# Patient Record
Sex: MALE | Race: WHITE | Employment: OTHER | URBAN - NONMETROPOLITAN AREA
[De-identification: names, ages, dates, MRNs, and addresses within clinical notes are randomized per-mention and may not be internally consistent; named-entity substitution may affect disease eponyms.]

---

## 2019-04-26 ENCOUNTER — OFFICE VISIT (OUTPATIENT)
Dept: PRIMARY CARE CLINIC | Age: 64
End: 2019-04-26
Payer: MEDICARE

## 2019-04-26 VITALS
HEART RATE: 120 BPM | HEIGHT: 69 IN | DIASTOLIC BLOOD PRESSURE: 76 MMHG | BODY MASS INDEX: 34.66 KG/M2 | TEMPERATURE: 97.6 F | OXYGEN SATURATION: 99 % | WEIGHT: 234 LBS | SYSTOLIC BLOOD PRESSURE: 128 MMHG

## 2019-04-26 DIAGNOSIS — E78.2 MIXED HYPERLIPIDEMIA: ICD-10-CM

## 2019-04-26 DIAGNOSIS — Z91.81 AT HIGH RISK FOR FALLS: ICD-10-CM

## 2019-04-26 DIAGNOSIS — F41.9 ANXIETY: ICD-10-CM

## 2019-04-26 DIAGNOSIS — Z76.89 ENCOUNTER TO ESTABLISH CARE: ICD-10-CM

## 2019-04-26 DIAGNOSIS — R11.0 NAUSEA: ICD-10-CM

## 2019-04-26 DIAGNOSIS — Z79.4 TYPE 2 DIABETES MELLITUS WITH OTHER CIRCULATORY COMPLICATION, WITH LONG-TERM CURRENT USE OF INSULIN (HCC): Primary | Chronic | ICD-10-CM

## 2019-04-26 DIAGNOSIS — E11.59 TYPE 2 DIABETES MELLITUS WITH OTHER CIRCULATORY COMPLICATION, WITH LONG-TERM CURRENT USE OF INSULIN (HCC): Primary | Chronic | ICD-10-CM

## 2019-04-26 DIAGNOSIS — G89.29 OTHER CHRONIC PAIN: ICD-10-CM

## 2019-04-26 LAB
AMPHETAMINE SCREEN, URINE: NORMAL
BARBITURATE SCREEN, URINE: NORMAL
BENZODIAZEPINE SCREEN, URINE: NORMAL
BUPRENORPHINE URINE: NORMAL
COCAINE METABOLITE SCREEN URINE: NORMAL
GABAPENTIN SCREEN, URINE: NORMAL
MDMA URINE: NORMAL
METHADONE SCREEN, URINE: NORMAL
METHAMPHETAMINE, URINE: NORMAL
OPIATE SCREEN URINE: NORMAL
OXYCODONE SCREEN URINE: NORMAL
PHENCYCLIDINE SCREEN URINE: NORMAL
PROPOXYPHENE SCREEN, URINE: NORMAL
THC SCREEN, URINE: NORMAL
TRICYCLIC ANTIDEPRESSANTS, UR: NORMAL

## 2019-04-26 PROCEDURE — 99203 OFFICE O/P NEW LOW 30 MIN: CPT | Performed by: NURSE PRACTITIONER

## 2019-04-26 PROCEDURE — 80305 DRUG TEST PRSMV DIR OPT OBS: CPT | Performed by: NURSE PRACTITIONER

## 2019-04-26 RX ORDER — ALPRAZOLAM 0.5 MG/1
0.5 TABLET ORAL 2 TIMES DAILY PRN
Refills: 2 | COMMUNITY
Start: 2019-04-11 | End: 2019-05-14 | Stop reason: SDUPTHER

## 2019-04-26 RX ORDER — PSEUDOEPHEDRINE HCL 30 MG
100 TABLET ORAL 2 TIMES DAILY
COMMUNITY
Start: 2017-07-24

## 2019-04-26 RX ORDER — HYDROXYZINE PAMOATE 25 MG/1
25 CAPSULE ORAL 3 TIMES DAILY PRN
Refills: 5 | COMMUNITY
Start: 2019-03-22 | End: 2019-09-06

## 2019-04-26 RX ORDER — GABAPENTIN 600 MG/1
600 TABLET ORAL 3 TIMES DAILY
Refills: 2 | COMMUNITY
Start: 2019-03-04 | End: 2019-05-22 | Stop reason: SDUPTHER

## 2019-04-26 RX ORDER — NITROGLYCERIN 0.4 MG/1
0.4 TABLET SUBLINGUAL EVERY 5 MIN PRN
COMMUNITY
Start: 2018-07-16

## 2019-04-26 RX ORDER — INSULIN GLARGINE 100 [IU]/ML
34 INJECTION, SOLUTION SUBCUTANEOUS NIGHTLY
COMMUNITY
End: 2019-05-08 | Stop reason: SDUPTHER

## 2019-04-26 RX ORDER — COLLAGENASE SANTYL 250 [ARB'U]/G
OINTMENT TOPICAL DAILY
COMMUNITY
Start: 2019-03-19

## 2019-04-26 RX ORDER — HYDROXYZINE HYDROCHLORIDE 25 MG/1
25 TABLET, FILM COATED ORAL NIGHTLY
Qty: 30 TABLET | Refills: 0 | Status: SHIPPED | OUTPATIENT
Start: 2019-04-26 | End: 2019-05-26

## 2019-04-26 RX ORDER — OXYCODONE AND ACETAMINOPHEN 10; 325 MG/1; MG/1
1 TABLET ORAL EVERY 8 HOURS PRN
Qty: 15 TABLET | Refills: 0 | Status: SHIPPED | OUTPATIENT
Start: 2019-04-26 | End: 2019-04-26 | Stop reason: SDUPTHER

## 2019-04-26 RX ORDER — OXYCODONE AND ACETAMINOPHEN 10; 325 MG/1; MG/1
1 TABLET ORAL EVERY 8 HOURS PRN
Qty: 15 TABLET | Refills: 0 | Status: SHIPPED | OUTPATIENT
Start: 2019-04-26 | End: 2019-05-01

## 2019-04-26 RX ORDER — BUDESONIDE AND FORMOTEROL FUMARATE DIHYDRATE 160; 4.5 UG/1; UG/1
AEROSOL RESPIRATORY (INHALATION)
COMMUNITY
Start: 2019-04-11

## 2019-04-26 RX ORDER — ISOSORBIDE MONONITRATE 30 MG/1
30 TABLET, EXTENDED RELEASE ORAL DAILY
Refills: 3 | COMMUNITY
Start: 2019-04-11

## 2019-04-26 RX ORDER — OXYCODONE AND ACETAMINOPHEN 10; 325 MG/1; MG/1
TABLET ORAL
COMMUNITY
Start: 2019-03-26 | End: 2019-04-26 | Stop reason: SDUPTHER

## 2019-04-26 ASSESSMENT — ENCOUNTER SYMPTOMS
WHEEZING: 0
BACK PAIN: 1
SHORTNESS OF BREATH: 0
APNEA: 0

## 2019-04-26 ASSESSMENT — PATIENT HEALTH QUESTIONNAIRE - PHQ9
SUM OF ALL RESPONSES TO PHQ9 QUESTIONS 1 & 2: 0
1. LITTLE INTEREST OR PLEASURE IN DOING THINGS: 0
2. FEELING DOWN, DEPRESSED OR HOPELESS: 0
SUM OF ALL RESPONSES TO PHQ QUESTIONS 1-9: 0
SUM OF ALL RESPONSES TO PHQ QUESTIONS 1-9: 0

## 2019-04-26 NOTE — PROGRESS NOTES
2019     Tab Arellano (:  1955) is a 70 y.o. male, here for evaluation of the following medical concerns:  Chief Complaint   Patient presents with    [facility]    Diabetes    Pain     generalized    Insomnia     HPI   Is a 25-year-old male patient who presents to [facility]. He has multiple complex medical problems. And has been previously followed by Dr. Salbador Schroeder and has several providers and he sees at [facility]. She has a history of cardiovascular disease and sees [facility] cardiology, he also has peripheral vascular disease and is followed by vascular surgery Dr. Dianne Velazquez and sees Dr. Armand Vasquez for podiatry. Long history of diabetes, hypertension, hyperlipidemia and chronic pain. She was requesting for us to take over his pain medication as his previous provider wanted him to go to pain management. Patient tells me he can not afford to go to a pain provider. He tells me that she had cut his pain medicine down to 90 a month as well as MS Contin for breakthrough. Explained to the patient that we would not be able to prescribe these medications either and that he would need to follow through with pain management. She has been out of his pain medication and does appear to be in significant pain today and his Harlo Bears and UDS are appropriate and he is not showing opioids, but has been out. Explained to the patient that if we prescribed his medication he would have to take them exactly as provided every 8 hours and not every 4 hours and he would have to follow up with pain management. He is agreeable, see detailed HPI below. Chronic Back Pain: Pain is worse. On average, pain is perceived as severe (6-8 pain scale). Change in quality of symptoms: no.  Associated symptoms: change in gait- Patient has a history of a transmetatarsal amputation on the right foot and states that he is having more difficulty getting around he is actually in a wheelchair today. Carlyn Barfield   He denies alz for apnea, shortness of breath and wheezing. Cardiovascular: Positive for leg swelling. Negative for chest pain and palpitations. Endocrine: Negative for cold intolerance, heat intolerance, polydipsia, polyphagia and polyuria. Genitourinary: Negative. Musculoskeletal: Positive for arthralgias and back pain. R trans met amputation   Skin: Positive for wound. Bilateral feet seeing Dr Lemuel Madison   Neurological: Negative. Hematological: Negative. Psychiatric/Behavioral: Positive for decreased concentration, dysphoric mood and sleep disturbance. Negative for agitation, behavioral problems, confusion, hallucinations, self-injury and suicidal ideas. The patient is nervous/anxious. The patient is not hyperactive. Prior to Visit Medications    Medication Sig Taking? Authorizing Provider   ALPRAZolam Derl Medicus) 0.5 MG tablet Take 0.5 mg by mouth 2 times daily as needed. Yes Historical Provider, MD   SYMBICORT 160-4.5 MCG/ACT AERO  Yes Historical Provider, MD   docusate (COLACE, DULCOLAX) 100 MG CAPS Take 100 mg by mouth 2 times daily Yes Historical Provider, MD   SANTYL 250 UNIT/GM ointment daily Yes Historical Provider, MD   gabapentin (NEURONTIN) 600 MG tablet Take 600 mg by mouth 3 times daily.  Yes Historical Provider, MD   hydrOXYzine (VISTARIL) 25 MG capsule Take 25 mg by mouth 3 times daily as needed Yes Historical Provider, MD   nitroGLYCERIN (NITROSTAT) 0.4 MG SL tablet Place 0.4 mg under the tongue every 5 minutes as needed Yes Historical Provider, MD   isosorbide mononitrate (IMDUR) 30 MG extended release tablet Take 30 mg by mouth daily Yes Historical Provider, MD   rivaroxaban (XARELTO) 20 MG TABS tablet Take 20 mg by mouth daily Yes Historical Provider, MD   insulin glargine (LANTUS) 100 UNIT/ML injection vial Inject 34 Units into the skin nightly Yes Historical Provider, MD   hydrOXYzine (ATARAX) 25 MG tablet Take 1 tablet by mouth nightly Yes MAXIME Maloney   hydrOXYzine (ATARAX) 25 MG tablet Take 1 tablet by mouth nightly Yes MAXIME Maloney   oxyCODONE-acetaminophen (PERCOCET)  MG per tablet Take 1 tablet by mouth every 8 hours as needed for Pain for up to 5 days. Yes Ino Pang, MD   clopidogrel (PLAVIX) 75 MG tablet Take 75 mg by mouth daily. Yes Historical Provider, MD   ranolazine (RANEXA) 500 MG SR tablet Take 1,000 mg by mouth 2 times daily. Yes Historical Provider, MD   furosemide (LASIX) 80 MG tablet Take 80 mg by mouth 2 times daily. Yes Historical Provider, MD   metoprolol (TOPROL-XL) 50 MG XL tablet Take 50 mg by mouth daily. Yes Historical Provider, MD   lovastatin (MEVACOR) 40 MG tablet Take 40 mg by mouth nightly. Yes Historical Provider, MD        Social History     Tobacco Use    Smoking status: Current Some Day Smoker     Packs/day: 1.00     Years: 27.50     Pack years: 27.50    Smokeless tobacco: Never Used   Substance Use Topics    Alcohol use: Yes     Comment: occ        Vitals:    04/26/19 1638   BP: 128/76   Pulse: 120   Temp: 97.6 °F (36.4 °C)   SpO2: 99%   Weight: 234 lb (106.1 kg)   Height: 5' 9\" (1.753 m)     Estimated body mass index is 34.56 kg/m² as calculated from the following:    Height as of this encounter: 5' 9\" (1.753 m). Weight as of this encounter: 234 lb (106.1 kg). Physical Exam   Constitutional: He is oriented to person, place, and time. Ill appearing 70year old male   HENT:   Head: Normocephalic and atraumatic. Right Ear: External ear normal.   Left Ear: External ear normal.   Nose: Nose normal.   Mouth/Throat: Oropharynx is clear and moist.   Eyes: Pupils are equal, round, and reactive to light. Neck: Normal range of motion. Neck supple. No JVD present. Cardiovascular: Normal rate, regular rhythm, normal heart sounds and intact distal pulses. Pulmonary/Chest: No respiratory distress. He has no wheezes. Musculoskeletal: Normal range of motion.         Lumbar back: He exhibits tenderness, pain and spasm. Ace wrap bilateral legs   Lymphadenopathy:     He has no cervical adenopathy. Neurological: He is alert and oriented to person, place, and time. Skin: Skin is warm and dry. Wounds bilateral feet       ASSESSMENT/PLAN:    Mayito Hickman was seen today for establish care, diabetes, pain and insomnia. Diagnoses and all orders for this visit:    Type 2 diabetes mellitus with other circulatory complication, with long-term current use of insulin ([facility] [address]  Comments:  We'll retrieve records from previous PCP will evaluate lab work to determine stability. Orders:  -     Hemoglobin A1C; Future  -     CBC; Future  -     Microalbumin / Creatinine Urine Ratio; Future  -     Comprehensive Metabolic Panel, Fasting; Future    Other chronic pain  Comments:  Discussed with Dr Allyn Elliott, will fill Percocet 3 times daily until May,1,19. In that point on patient will have to be followed by pain management. Orders:  -     Discontinue: oxyCODONE-acetaminophen (PERCOCET)  MG per tablet; Take 1 tablet by mouth every 8 hours as needed for Pain for up to 5 days. -     oxyCODONE-acetaminophen (PERCOCET)  MG per tablet; Take 1 tablet by mouth every 8 hours as needed for Pain for up to 5 days. At high risk for falls  -     POCT Rapid Drug Screen    Nausea  -     hydrOXYzine (ATARAX) 25 MG tablet; Take 1 tablet by mouth nightly    Anxiety  -     hydrOXYzine (ATARAX) 25 MG tablet; Take 1 tablet by mouth nightly    Mixed hyperlipidemia  -     Lipid Panel; Future    Encounter to establish care    Other orders  -     hydrOXYzine (ATARAX) 25 MG tablet; Take 1 tablet by mouth nightly        Return in about 3 months (around 7/26/2019), or if symptoms worsen or fail to improve. An electronic signature was used to authenticate this note. EMR Dragon/transcription disclaimer: Much of this encounter note is an electronic transcription/translation of spoken language to printed text.  The electronic translation of spoken language may permit erroneous, or at times, nonsensical words or phrases to be inadvertently transcribed. Although I have reviewed the note for such errors, some may still exist    --MAXIME De Los Santos on 4/30/2019 at 11:27 AM    On the basis of positive falls risk screening, assessment and plan is as follows: in-office gait and balance testing performed using The Timed Up and Go Test was positive for increased falls risk, home safety tips provided.

## 2019-05-10 RX ORDER — INSULIN GLARGINE 100 [IU]/ML
34 INJECTION, SOLUTION SUBCUTANEOUS NIGHTLY
Qty: 1 VIAL | Refills: 3 | Status: SHIPPED | OUTPATIENT
Start: 2019-05-10

## 2019-05-14 DIAGNOSIS — F41.9 ANXIETY: Primary | ICD-10-CM

## 2019-05-14 NOTE — TELEPHONE ENCOUNTER
Kelsie Kruse called to request a refill on his medication. Last office visit : 4/26/2019   Next office visit : 7/26/2019     Last UDS:   Amphetamine Screen, Urine   Date Value Ref Range Status   04/26/2019 -  Final     Barbiturate Screen, Urine   Date Value Ref Range Status   04/26/2019 -  Final     Benzodiazepine Screen, Urine   Date Value Ref Range Status   04/26/2019 +  Final     Buprenorphine Urine   Date Value Ref Range Status   04/26/2019 -  Final     Cocaine Metabolite Screen, Urine   Date Value Ref Range Status   04/26/2019 -  Final     Gabapentin Screen, Urine   Date Value Ref Range Status   04/26/2019 -  Final     MDMA, Urine   Date Value Ref Range Status   04/26/2019 -  Final     Methamphetamine, Urine   Date Value Ref Range Status   04/26/2019 -  Final     Opiate Scrn, Ur   Date Value Ref Range Status   04/26/2019 -  Final     Oxycodone Screen, Ur   Date Value Ref Range Status   04/26/2019 -  Final     PCP Screen, Urine   Date Value Ref Range Status   04/26/2019 -  Final     Propoxyphene Screen, Urine   Date Value Ref Range Status   04/26/2019 -  Final     THC Screen, Urine   Date Value Ref Range Status   04/26/2019 -  Final     Tricyclic Antidepressants, Urine   Date Value Ref Range Status   04/26/2019 -  Final       Last [place]: not on file   Medication Contract: not on file     Requested Prescriptions     Pending Prescriptions Disp Refills    ALPRAZolam (XANAX) 0.5 MG tablet 60 tablet 2     Sig: Take 1 tablet by mouth 2 times daily as needed. Please approve or refuse this medication.    Cari Bardales

## 2019-05-15 ENCOUNTER — TELEPHONE (OUTPATIENT)
Dept: PRIMARY CARE CLINIC | Age: 64
End: 2019-05-15

## 2019-05-15 RX ORDER — ALPRAZOLAM 0.5 MG/1
0.5 TABLET ORAL 2 TIMES DAILY PRN
Qty: 60 TABLET | Refills: 2 | Status: SHIPPED | OUTPATIENT
Start: 2019-05-15 | End: 2019-07-03 | Stop reason: SDUPTHER

## 2019-05-21 DIAGNOSIS — E11.59 TYPE 2 DIABETES MELLITUS WITH OTHER CIRCULATORY COMPLICATION, WITH LONG-TERM CURRENT USE OF INSULIN (HCC): Primary | ICD-10-CM

## 2019-05-21 DIAGNOSIS — Z79.4 TYPE 2 DIABETES MELLITUS WITH OTHER CIRCULATORY COMPLICATION, WITH LONG-TERM CURRENT USE OF INSULIN (HCC): Primary | ICD-10-CM

## 2019-05-21 RX ORDER — GLUCOSAMINE HCL/CHONDROITIN SU 500-400 MG
CAPSULE ORAL
Qty: 100 STRIP | Refills: 5 | Status: SHIPPED | OUTPATIENT
Start: 2019-05-21

## 2019-05-21 NOTE — TELEPHONE ENCOUNTER
Requested Prescriptions     Signed Prescriptions Disp Refills    blood glucose monitor strips 100 strip 5     Sig: Test 3 times a day & as needed for symptoms of irregular blood glucose. Authorizing Provider: Vicenta Fajardo     Ordering User: Silverio Stratton         Pt. Daughter called and states pt. Is currently out of test strips and requested refill. Refill has been sent to [facility] as requested.

## 2019-05-22 ENCOUNTER — TELEPHONE (OUTPATIENT)
Dept: PRIMARY CARE CLINIC | Age: 64
End: 2019-05-22

## 2019-05-22 DIAGNOSIS — I25.119 CORONARY ARTERY DISEASE WITH ANGINA PECTORIS, UNSPECIFIED VESSEL OR LESION TYPE, UNSPECIFIED WHETHER NATIVE OR TRANSPLANTED HEART (HCC): ICD-10-CM

## 2019-05-22 DIAGNOSIS — G62.9 NEUROPATHY: ICD-10-CM

## 2019-05-22 DIAGNOSIS — E11.59 TYPE 2 DIABETES MELLITUS WITH OTHER CIRCULATORY COMPLICATION, WITH LONG-TERM CURRENT USE OF INSULIN (HCC): ICD-10-CM

## 2019-05-22 DIAGNOSIS — E78.2 MIXED HYPERLIPIDEMIA: ICD-10-CM

## 2019-05-22 DIAGNOSIS — Z79.4 TYPE 2 DIABETES MELLITUS WITH OTHER CIRCULATORY COMPLICATION, WITH LONG-TERM CURRENT USE OF INSULIN (HCC): ICD-10-CM

## 2019-05-22 DIAGNOSIS — G62.9 NEUROPATHY: Primary | ICD-10-CM

## 2019-05-22 LAB
ANION GAP SERPL CALCULATED.3IONS-SCNC: 15 MMOL/L (ref 7–19)
BUN BLDV-MCNC: 9 MG/DL (ref 8–23)
CALCIUM SERPL-MCNC: 8.7 MG/DL (ref 8.8–10.2)
CHLORIDE BLD-SCNC: 100 MMOL/L (ref 98–111)
CO2: 26 MMOL/L (ref 22–29)
CREAT SERPL-MCNC: 0.8 MG/DL (ref 0.5–1.2)
GFR NON-AFRICAN AMERICAN: >60
GLUCOSE BLD-MCNC: 169 MG/DL (ref 74–109)
POTASSIUM SERPL-SCNC: 4.3 MMOL/L (ref 3.5–5)
SODIUM BLD-SCNC: 141 MMOL/L (ref 136–145)

## 2019-05-22 RX ORDER — GABAPENTIN 600 MG/1
600 TABLET ORAL 3 TIMES DAILY
Qty: 90 TABLET | Refills: 2 | OUTPATIENT
Start: 2019-05-22 | End: 2019-10-05 | Stop reason: SDUPTHER

## 2019-05-22 NOTE — TELEPHONE ENCOUNTER
Patient was seen a few weeks ago and forgot to ask for refill on Gabapentin refill for Neuropathy. Phoned into [facility] per request and approval of Dr. Orlando Connell.

## 2019-05-22 NOTE — TELEPHONE ENCOUNTER
Patient came by the office and asked if we could fax the lab results that he just had done to [facility]. The fax number is [phone].

## 2019-05-22 NOTE — TELEPHONE ENCOUNTER
Patient also will be having lab work done today and request that we add on a BMP for another doctors office at [facility]. I will see that patient gets a copy of the results to take to the other doctor.

## 2019-05-30 NOTE — TELEPHONE ENCOUNTER
Labs faxed to [facility] although they should be able to see these on Care Everywhere.

## 2019-05-31 ENCOUNTER — TELEPHONE (OUTPATIENT)
Dept: PRIMARY CARE CLINIC | Age: 64
End: 2019-05-31

## 2019-05-31 NOTE — TELEPHONE ENCOUNTER
Left message for patient with results    Please notify patient of abnormal results. Focus on managing blood sugar otherwise stable    Associated Results     Result Notes for Basic Metabolic Panel     Notes recorded by MAXIME Javier on 5/29/2019 at 1:26 PM CDT  Please notify patient of abnormal results. Focus on managing blood sugar otherwise stable   Contains abnormal data Basic Metabolic Panel   Order: [id number]   Status:  Final result   Visible to patient:  No (Not Released) Dx:  Mixed hyperlipidemia; Neuropathy; Cor... Ref Range & Units 9d ago  (5/22/19) 3yr ago  (8/18/15) 3yr ago  (8/17/15)   Sodium 136 - 145 mmol/L 141  140  137    Potassium 3.5 - 5.0 mmol/L 4.3  3.9  4.4    Chloride 98 - 111 mmol/L 100  101  95Low     CO2 22 - 29 mmol/L 26  27  28    Anion Gap 7 - 19 mmol/L 15  12  14    Glucose 74 - 109 mg/dL 169High   107 R, CM 170High  R, CM   BUN 8 - 23 mg/dL 9  12 R 12 R   CREATININE 0.5 - 1.2 mg/dL 0.8  1.0 R, CM 0.9 R, CM   GFR Non- >60 >60      Comment: This calculation may be inaccurate for patients under the age of 25 years. For ages 25 and older, a GFR >60 mL/min/1.73m2 (not corrected for weight) is   valid for stable renal function.     Calcium 8.8 - 10.2 mg/dL 8.7Low   8.8 R 9.5 R   Gfr Calculated   81  91    Total Protein   6.7  7.4    Alb   3.5  4.2    Total Bilirubin   0.2  0.3    Alkaline Phosphatase   50  60    AST   12  15    ALT   10  12    Resulting [facility] [address]         Specimen Collected: 05/22/19 11:09 Last Resulted: 05/22/19 13:59         Lab Flowsheet       Order Details       View Encounter       Lab and Collection Details       Routing       Result History         CM=Additional comments  R=Reference range differs from displayed range

## 2019-05-31 NOTE — TELEPHONE ENCOUNTER
----- Message from MAXIME Donovan sent at 5/29/2019  1:26 PM CDT -----  Please notify patient of abnormal results.   Focus on managing blood sugar otherwise stable

## 2019-06-10 ENCOUNTER — OFFICE VISIT (OUTPATIENT)
Dept: PRIMARY CARE CLINIC | Age: 64
End: 2019-06-10
Payer: MEDICARE

## 2019-06-10 ENCOUNTER — HOSPITAL ENCOUNTER (OUTPATIENT)
Dept: GENERAL RADIOLOGY | Age: 64
Discharge: HOME | End: 2019-06-10
Payer: MEDICARE

## 2019-06-10 VITALS
BODY MASS INDEX: 36.64 KG/M2 | HEART RATE: 105 BPM | SYSTOLIC BLOOD PRESSURE: 126 MMHG | TEMPERATURE: 97.2 F | HEIGHT: 69 IN | DIASTOLIC BLOOD PRESSURE: 70 MMHG | RESPIRATION RATE: 16 BRPM | WEIGHT: 247.4 LBS | OXYGEN SATURATION: 98 %

## 2019-06-10 DIAGNOSIS — G89.29 OTHER CHRONIC PAIN: ICD-10-CM

## 2019-06-10 DIAGNOSIS — M86.271 SUBACUTE OSTEOMYELITIS OF RIGHT FOOT (HCC): ICD-10-CM

## 2019-06-10 DIAGNOSIS — Z89.431 PARTIAL NONTRAUMATIC AMPUTATION OF FOOT, RIGHT (HCC): ICD-10-CM

## 2019-06-10 DIAGNOSIS — J44.1 COPD EXACERBATION (HCC): Primary | ICD-10-CM

## 2019-06-10 DIAGNOSIS — J44.1 COPD EXACERBATION (HCC): ICD-10-CM

## 2019-06-10 PROCEDURE — 99214 OFFICE O/P EST MOD 30 MIN: CPT | Performed by: FAMILY MEDICINE

## 2019-06-10 PROCEDURE — 71046 X-RAY EXAM CHEST 2 VIEWS: CPT

## 2019-06-10 RX ORDER — AZITHROMYCIN 250 MG/1
250 TABLET, FILM COATED ORAL DAILY
Qty: 6 TABLET | Refills: 0 | Status: SHIPPED | OUTPATIENT
Start: 2019-06-10 | End: 2019-06-15

## 2019-06-10 RX ORDER — VANCOMYCIN HYDROCHLORIDE 5 G/100ML
INJECTION, POWDER, LYOPHILIZED, FOR SOLUTION INTRAVENOUS
COMMUNITY
Start: 2019-06-05

## 2019-06-10 RX ORDER — OXYCODONE AND ACETAMINOPHEN 10; 325 MG/1; MG/1
1 TABLET ORAL EVERY 8 HOURS PRN
Qty: 90 TABLET | Refills: 0 | Status: SHIPPED | OUTPATIENT
Start: 2019-06-10 | End: 2019-07-03 | Stop reason: SDUPTHER

## 2019-06-10 NOTE — PROGRESS NOTES
Per Dr. Singh Said pt. Was provided with Spiriva Respimat 2.5 inhaler sample. LOT # [id number] Exp Date: 12/2020. Pt. Provided with sample in office and instructed to inhale 2 puffs daily each morning and VU.

## 2019-06-10 NOTE — PROGRESS NOTES
Kelsie Kruse is a 70 y.o. male who presents today for   Chief Complaint   Patient presents with    Breathing Problem     SOB x2-3 weeks    Other     infection in bone of left foot-on Vanc for this       HPI  Patient is here for concern of shortness of breath ongoing for about one month. He is a history  of pneumonia with bilateral collapse along he states and he states it feels somewhat similar to  that time. In its early infancy of the disease. He denies any chest pain but he does have  persistent exertional dyspnea. He's currently on Symbicort but not on any inhaled anti  -muscarinic therapy at this time. Patient also notes that he is battling bilateral lower extremity  wounds along with osteomyelitis of the right  foot. He did go to pain management as suggested  and has brought in paperwork today noting that the pain management he was referred to was  not in network and they would only pay for one office visit. Patient has been on Percocet 10 mg  previously three times  per day for his chronic pain of the lower extremities    No change in PMH, family, social, or surgical history unless mentioned above. Review of Systems   Constitutional: Negative for chills, fever and unexpected weight change. Respiratory: Negative for cough, chest tightness, shortness of breath and wheezing. Cardiovascular: Negative for chest pain, palpitations and leg swelling. Gastrointestinal: Negative for abdominal pain, constipation, diarrhea, nausea and vomiting. Genitourinary: Negative for difficulty urinating, dysuria, enuresis and frequency. Musculoskeletal: Positive for arthralgias, back pain and gait problem. Skin: Positive for wound. Neurological: Negative for weakness and numbness.           Past Medical History:   Diagnosis Date    Atherosclerosis of native arteries of the extremities with intermittent claudication     CAD (coronary artery disease)     EF 40 %    Chronic back pain     Diabetes 97.2 °F (36.2 °C) (Temporal)   Resp 16   Ht 5' 9\" (1.753 m)   Wt 247 lb 6.4 oz (112.2 kg)   SpO2 98%   BMI 36.53 kg/m²     Physical Exam   Constitutional: He is oriented to person, place, and time. He appears well-developed and well-nourished. Non-toxic appearance. No distress. HENT:   Head: Normocephalic and atraumatic. Cardiovascular: Normal rate, regular rhythm, normal heart sounds and intact distal pulses. Exam reveals no gallop and no friction rub. No murmur heard. Pulmonary/Chest: Effort normal and breath sounds normal. No respiratory distress. He has no wheezes. He has no rales. He exhibits no tenderness. Abdominal: Soft. Bowel sounds are normal. He exhibits no distension and no mass. There is no tenderness. There is no rebound and no guarding. Musculoskeletal:        Lumbar back: He exhibits decreased range of motion and tenderness. He exhibits no bony tenderness. Right lower leg: He exhibits no edema. Left lower leg: He exhibits no edema. Right foot: There is deformity (transmetatarssal amputationw/ boot in place b/l). Neurological: He is alert and oriented to person, place, and time. Gait (cane) abnormal. Coordination normal.   Skin: Skin is warm and dry. He is not diaphoretic. No cyanosis. Nails show no clubbing. Nursing note and vitals reviewed. Assessment:    ICD-10-CM    1. COPD exacerbation ([facility]) J44.1 XR CHEST STANDARD (2 VW)   2. Other chronic pain G89.29 oxyCODONE-acetaminophen (PERCOCET)  MG per tablet   3. Partial nontraumatic amputation of foot, right ([facility] [address] Z89.431    4. Subacute osteomyelitis of right foot ([facility]) M86.271        Plan:   atient has mild COPD exacerbation with uncontrolled underlying COPD. I will give him a spiriva sample today and send in spiriva respimat for him. He needs to continue his Symbicort. Likely this fall he needs to have a COPD action in place.  Patient will also receive  short-term chronic pain medication from us

## 2019-06-11 PROBLEM — M86.271 SUBACUTE OSTEOMYELITIS OF RIGHT FOOT (HCC): Status: ACTIVE | Noted: 2019-06-11

## 2019-06-11 PROBLEM — J44.1 COPD EXACERBATION (HCC): Status: ACTIVE | Noted: 2019-06-11

## 2019-06-11 PROBLEM — Z89.431 PARTIAL NONTRAUMATIC AMPUTATION OF FOOT, RIGHT (HCC): Status: ACTIVE | Noted: 2019-06-11

## 2019-06-11 ASSESSMENT — ENCOUNTER SYMPTOMS
ABDOMINAL PAIN: 0
CONSTIPATION: 0
CHEST TIGHTNESS: 0
DIARRHEA: 0
VOMITING: 0
COUGH: 0
SHORTNESS OF BREATH: 0
WHEEZING: 0
NAUSEA: 0
BACK PAIN: 1

## 2019-06-26 ENCOUNTER — TELEPHONE (OUTPATIENT)
Dept: PRIMARY CARE CLINIC | Age: 64
End: 2019-06-26

## 2019-06-26 NOTE — TELEPHONE ENCOUNTER
Pt is still SOB and feels tight in his chest.,  He walks about 400 feet and he feels like he cant make it due to his breathing.       Edema has decreased on his BLE , he goes to Wound care at [facility] and they wrap his LE .

## 2019-06-26 NOTE — TELEPHONE ENCOUNTER
----- Message from Delroy Turpin MD sent at 6/25/2019  8:55 PM CDT -----  Please call patient to let them know about result. Thanks!   Just want to call and check in on breathing, chronic lung changes

## 2019-06-28 DIAGNOSIS — F41.9 ANXIETY: ICD-10-CM

## 2019-06-28 DIAGNOSIS — G89.29 OTHER CHRONIC PAIN: ICD-10-CM

## 2019-07-03 RX ORDER — OXYCODONE AND ACETAMINOPHEN 10; 325 MG/1; MG/1
1 TABLET ORAL EVERY 8 HOURS PRN
Qty: 90 TABLET | Refills: 0 | Status: SHIPPED | OUTPATIENT
Start: 2019-07-03 | End: 2019-08-03 | Stop reason: SDUPTHER

## 2019-07-03 RX ORDER — ALPRAZOLAM 0.5 MG/1
0.5 TABLET ORAL 2 TIMES DAILY PRN
Qty: 60 TABLET | Refills: 2 | Status: SHIPPED | OUTPATIENT
Start: 2019-07-03 | End: 2019-08-03 | Stop reason: SDUPTHER

## 2019-07-09 ENCOUNTER — TELEPHONE (OUTPATIENT)
Dept: INTERNAL MEDICINE | Age: 64
End: 2019-07-09

## 2019-07-09 RX ORDER — FUROSEMIDE 80 MG
80 TABLET ORAL 2 TIMES DAILY
Qty: 60 TABLET | Refills: 1 | Status: SHIPPED | OUTPATIENT
Start: 2019-07-09 | End: 2019-09-06

## 2019-07-09 NOTE — TELEPHONE ENCOUNTER
getting patient Antolin Bares as it is very expensive. Pt is taking samples given. Patient to bring all medications to HFU and will contact office with any concerns.     Scheduled appointment with PCP within 7-14 days    Follow Up  Future Appointments   Date Time Provider Jamil Walton   7/12/2019 11:15 AM Randi Stewart [id number] LPN

## 2019-07-10 RX ORDER — LOVASTATIN 40 MG/1
80 TABLET ORAL NIGHTLY
Qty: 30 TABLET | Refills: 2 | Status: SHIPPED | OUTPATIENT
Start: 2019-07-10 | End: 2019-07-12 | Stop reason: SDUPTHER

## 2019-07-12 ENCOUNTER — OFFICE VISIT (OUTPATIENT)
Dept: PRIMARY CARE CLINIC | Age: 64
End: 2019-07-12
Payer: MEDICARE

## 2019-07-12 ENCOUNTER — CARE COORDINATION (OUTPATIENT)
Dept: CARE COORDINATION | Age: 64
End: 2019-07-12

## 2019-07-12 VITALS
DIASTOLIC BLOOD PRESSURE: 78 MMHG | HEART RATE: 88 BPM | TEMPERATURE: 98 F | SYSTOLIC BLOOD PRESSURE: 126 MMHG | OXYGEN SATURATION: 98 % | RESPIRATION RATE: 20 BRPM

## 2019-07-12 DIAGNOSIS — J44.9 CHRONIC OBSTRUCTIVE PULMONARY DISEASE, UNSPECIFIED COPD TYPE (HCC): Chronic | ICD-10-CM

## 2019-07-12 DIAGNOSIS — Z09 HOSPITAL DISCHARGE FOLLOW-UP: ICD-10-CM

## 2019-07-12 DIAGNOSIS — M86.9 OSTEOMYELITIS OF LEFT FOOT, UNSPECIFIED TYPE (HCC): Chronic | ICD-10-CM

## 2019-07-12 DIAGNOSIS — I50.42 CHRONIC COMBINED SYSTOLIC AND DIASTOLIC CONGESTIVE HEART FAILURE (HCC): Chronic | ICD-10-CM

## 2019-07-12 DIAGNOSIS — E78.2 MIXED HYPERLIPIDEMIA: ICD-10-CM

## 2019-07-12 DIAGNOSIS — I10 ESSENTIAL HYPERTENSION: Primary | Chronic | ICD-10-CM

## 2019-07-12 DIAGNOSIS — Z76.0 MEDICATION REFILL: ICD-10-CM

## 2019-07-12 PROCEDURE — 99214 OFFICE O/P EST MOD 30 MIN: CPT | Performed by: NURSE PRACTITIONER

## 2019-07-12 PROCEDURE — 1111F DSCHRG MED/CURRENT MED MERGE: CPT | Performed by: NURSE PRACTITIONER

## 2019-07-12 RX ORDER — POTASSIUM CHLORIDE 750 MG/1
10 CAPSULE, EXTENDED RELEASE ORAL
COMMUNITY
Start: 2019-07-09

## 2019-07-12 RX ORDER — LOVASTATIN 40 MG/1
80 TABLET ORAL NIGHTLY
Qty: 30 TABLET | Refills: 5 | Status: SHIPPED | OUTPATIENT
Start: 2019-07-12

## 2019-07-12 NOTE — PROGRESS NOTES
tablet  Take 1 tablet by mouth 3 times daily for 30 days. hydrOXYzine (VISTARIL) 25 MG capsule  Take 25 mg by mouth 3 times daily as needed             insulin glargine (LANTUS) 100 UNIT/ML injection vial  Inject 34 Units into the skin nightly             isosorbide mononitrate (IMDUR) 30 MG extended release tablet  Take 30 mg by mouth daily             lovastatin (MEVACOR) 40 MG tablet  Take 2 tablets by mouth nightly             metoprolol (TOPROL-XL) 50 MG XL tablet  Take 50 mg by mouth daily. nitroGLYCERIN (NITROSTAT) 0.4 MG SL tablet  Place 0.4 mg under the tongue every 5 minutes as needed             oxyCODONE-acetaminophen (PERCOCET)  MG per tablet  Take 1 tablet by mouth every 8 hours as needed for Pain for up to 30 days. Intended supply: 30 days             potassium chloride (MICRO-K) 10 MEQ extended release capsule  Take 10 mEq by mouth             ranolazine (RANEXA) 500 MG SR tablet  Take 1,000 mg by mouth 2 times daily.                rivaroxaban (XARELTO) 20 MG TABS tablet  Take 20 mg by mouth daily             sacubitril-valsartan (ENTRESTO) 24-26 MG per tablet  Take 1 tablet by mouth             SANTYL 250 UNIT/GM ointment  daily             SYMBICORT 160-4.5 MCG/ACT AERO               tiotropium (SPIRIVA RESPIMAT) 2.5 MCG/ACT AERS inhaler  Inhale 2 puffs into the lungs daily             vancomycin (VANCOCIN) 5 g injection                     Medications marked \"taking\" at this time  Outpatient Medications Marked as Taking for the 7/12/19 encounter (Office Visit) with MAXIME Hook   Medication Sig Dispense Refill    potassium chloride (MICRO-K) 10 MEQ extended release capsule Take 10 mEq by mouth      sacubitril-valsartan (ENTRESTO) 24-26 MG per tablet Take 1 tablet by mouth      lovastatin (MEVACOR) 40 MG tablet Take 2 tablets by mouth nightly 30 tablet 5    furosemide (LASIX) 80 MG tablet Take 1 tablet by mouth 2 times daily 60 tablet 1    ALPRAZolam [place] on July 3, 2019 and discharged home on July 8, 2019 for chronic combined systolic and diastolic congestive heart failure. Patient is followed by Dr. Juanis Jacome for cardiology and is also being followed by wound care, Dr. Priyanka Conway at [facility]. Currently has a Zoll defibrillator in place and has a LifeVest that he wears at night. He was started on Entresto, for his his congestive heart failure however he tells me he could not afford it and has not taken it since his discharge. He Lost a total of 15 pounds during his admission and his congestive heart failure was stabilized. He currently has poorly healing chronic wounds with osteomyelitis of the lower extremities. Has a PICC line in place and is getting vancomycin as an outpatient and is being followed as stated above by wound care. Patient tells me that he is currently receiving his medication at home and his family is administering it. He is not being followed by home health nor does he wish to. He was wanting to speak to care coordination about assistance with medication so I have notified William Public. He is on a 1500 mL fluid restriction and tells me he has been compliant with that. His admission his basal insulin was increased as his hemoglobin A1c was 8.7. Asthma he has been watching his diet and taking his insulin as ordered however in the short time we have been taking his not seem to be very compliant. Inpatient course: Discharge summary reviewed- see chart. Interval history/Current status:     Review of Systems   Constitutional: Positive for fatigue. Negative for activity change, appetite change, chills, fever and unexpected weight change. HENT: Negative. Respiratory: Positive for wheezing. Negative for apnea and shortness of breath. Cardiovascular: Positive for leg swelling. Negative for chest pain and palpitations. Gastrointestinal: Negative. Genitourinary: Positive for difficulty urinating.  Negative for decreased urine volume and hematuria. Musculoskeletal: Positive for arthralgias, back pain, gait problem and myalgias. Negative for joint swelling, neck pain and neck stiffness. Skin: Positive for wound. Neurological: Positive for weakness. Negative for dizziness, syncope and numbness. Hematological: Negative. Psychiatric/Behavioral: Negative. Vitals:    07/12/19 1125   BP: 126/78   Pulse: 88   Resp: 20   Temp: 98 °F (36.7 °C)   SpO2: 98%     There is no height or weight on file to calculate BMI. Wt Readings from Last 3 Encounters:   06/10/19 247 lb 6.4 oz (112.2 kg)   04/26/19 234 lb (106.1 kg)   04/20/11 286 lb (129.7 kg)     BP Readings from Last 3 Encounters:   07/12/19 126/78   06/10/19 126/70   04/26/19 128/76       Physical Exam   Constitutional: He is oriented to person, place, and time. He appears well-developed and well-nourished. HENT:   Head: Normocephalic and atraumatic. Eyes: Pupils are equal, round, and reactive to light. Neck: Normal range of motion. Neck supple. No JVD present. No thyromegaly present. Cardiovascular: Normal rate, regular rhythm and normal heart sounds. Exam reveals no gallop and no friction rub. No murmur heard. Zoll Defibrillator,life vest in place   Pulmonary/Chest: Effort normal. No respiratory distress. He has decreased breath sounds. He has no wheezes. He exhibits no tenderness. Abdominal: Soft. Bowel sounds are normal. He exhibits no distension and no mass. There is no rebound. Musculoskeletal: He exhibits edema. He exhibits no tenderness or deformity. Edema in both lower extremities patient has cellulitis and is currently being treated with vancomycin   Lymphadenopathy:     He has no cervical adenopathy. Neurological: He is alert and oriented to person, place, and time. Coordination abnormal.   Skin: Skin is warm and dry.    Leg and foot ulcers bilateral,dressing dry and intact Bilateral LE's   Nursing note and vitals reviewed. Assessment/Plan:    Colin Pichardo was seen today for follow-up from hospital.    Diagnoses and all orders for this visit:    Essential hypertension    Osteomyelitis of left foot, unspecified type ([facility] [address]  Comments: Follow-up with wound care as arranged, continue vancomycin as ordered  Orders:  -     [place] DISCHARGE MEDS RECONCILED W/ CURRENT OUTPATIENT MED LIST    Chronic combined systolic and diastolic congestive heart failure ([facility] [address]  Comments:  Continue current medications do recommend starting Entresto as ordered by cardiology. 1500 mL fluid restriction, daily weight    Chronic obstructive pulmonary disease, unspecified COPD type ([facility] [address]  Comments:  Highly recommend smoking cessation patient is still currently smoking but did quit during his time in the hospital.  Is not ready to quit at this time    Mixed hyperlipidemia  -     lovastatin (MEVACOR) 40 MG tablet; Take 2 tablets by mouth nightly    Hospital discharge follow-up    Medication refill      Medical Decision Making: high complexity      Patient does not meet criteria for hospital discharge follow-up because he was seen by wound care and has been back to the emergency room.

## 2019-07-12 NOTE — Clinical Note
I think Jey Rodriguez looked into getting his Dangelo Bares and there was something else covered. However, since he is seeing Dr. Cuauhtemoc Lewis, can we check with her and see what they will cover and see if it is okay to switch him.   He is currently not taking the Dangelo Bares

## 2019-07-15 ASSESSMENT — ENCOUNTER SYMPTOMS
BACK PAIN: 1
APNEA: 0
GASTROINTESTINAL NEGATIVE: 1
SHORTNESS OF BREATH: 0
WHEEZING: 1

## 2019-08-15 ENCOUNTER — OFFICE VISIT (OUTPATIENT)
Dept: PRIMARY CARE CLINIC | Age: 64
End: 2019-08-15
Payer: MEDICARE

## 2019-08-15 VITALS
TEMPERATURE: 97.8 F | HEIGHT: 69 IN | WEIGHT: 225 LBS | RESPIRATION RATE: 20 BRPM | SYSTOLIC BLOOD PRESSURE: 112 MMHG | DIASTOLIC BLOOD PRESSURE: 64 MMHG | BODY MASS INDEX: 33.33 KG/M2 | HEART RATE: 108 BPM | OXYGEN SATURATION: 97 %

## 2019-08-15 DIAGNOSIS — I10 ESSENTIAL HYPERTENSION: Chronic | ICD-10-CM

## 2019-08-15 DIAGNOSIS — E78.2 MIXED HYPERLIPIDEMIA: Chronic | ICD-10-CM

## 2019-08-15 DIAGNOSIS — E11.49 DM (DIABETES MELLITUS), TYPE 2 WITH NEUROLOGICAL COMPLICATIONS (HCC): Chronic | ICD-10-CM

## 2019-08-15 DIAGNOSIS — R11.0 NAUSEA: Primary | ICD-10-CM

## 2019-08-15 LAB — HBA1C MFR BLD: 8.6 %

## 2019-08-15 PROCEDURE — 99214 OFFICE O/P EST MOD 30 MIN: CPT | Performed by: NURSE PRACTITIONER

## 2019-08-15 RX ORDER — ONDANSETRON 4 MG/1
4 TABLET, FILM COATED ORAL 3 TIMES DAILY PRN
Qty: 30 TABLET | Refills: 0 | Status: SHIPPED | OUTPATIENT
Start: 2019-08-15

## 2019-08-15 NOTE — Clinical Note
Point-of-care hemoglobin A1c was not documented in the chart, please retrieve and document appropriately

## 2019-08-15 NOTE — PROGRESS NOTES
8/15/2019     Suzette Novoa (:  1955) is a 70 y.o. male, here for evaluation of the following medical concerns:  Chief Complaint   Patient presents with    1 Month Follow-Up     would like something for nausea, insurance doesnt cover current prescribed medication    Diabetes    Hypertension    Hyperlipidemia     HPI   Mr. Erlinda Lane is a 56-year-old male patient with multiple complex medical problems. He has a long history of type 2 diabetes, hyperlipidemia stage III CKD. Patient also has cardiomyopathy, chronic CHF and chronic wounds on his lower extremity. Patient reports that he has been doing fairly well but has ongoing chronic pain and nausea as his primary concerns today. He is actually here to follow-up on his diabetes hypertension and hyperlipidemia. Treatment Adherence:   Medication compliance:  compliant most of the time  Diet compliance:  compliant most of the time  Weight trend: stable  Current exercise: no regular exercise  Barriers: unsteady balance    Diabetes Mellitus Type 2: Current symptoms/problems include none. Home blood sugar records: fasting range: 150  Any episodes of hypoglycemia? no  Eye exam current (within one year): no  Tobacco history: He  reports that he has been smoking. He has a 27.50 pack-year smoking history. He has never used smokeless tobacco.   Daily Aspirin? Plavix    Hypertension:  Home blood pressure monitoring: No.  He is adherent to a low sodium diet. Patient denies chest pain, lightheadedness, palpitations and fatigue. Antihypertensive medication side effects: no medication side effects noted. Use of agents associated with hypertension: none. Hyperlipidemia:  No new myalgias or GI upset on lovastatin (Mevacor). Nausea   Patient complains of nausea and vomiting. Onset of symptoms was several weeks ago. Patient describes nausea as moderate. Vomiting has occurred a few times over the past a few weeks.  NO VOMITING Symptoms have you miss a dose of medicine, take it as soon as possible,   but DO NOT DOUBLE A DOSE   Read your medicine information when you get home  o Know all side effects of your medicines  o Call your doctors office if you have any side effects   Plan ahead so you do not run out of medicine.  Be sure all your doctors know medicines and herbs that you take (including cold, flu, and herbal medicines)   Carry a list of your medicines with you. LIFE STYLE CHANGES:   Follow your meal plan as directed.  Break the smoking habit.  Do not skip meals   Take all your medications   Wear Diabetes ID Bracelet   Stay active (Follow doctors recommendations for starting exercise program)   Monitor blood sugar and report to physician blood sugars that are out of target range   Get yearly dilated eye exam   Get dental exam every six months.  Do daily foot exam    QUIT SMOKING/TOBACCO USE:   It is important for you to quit smoking   Please talk with your doctor and ask about different ways for you to stop    DIABETES PATIENTS: Seek care immediately if:  o You are having trouble staying awake or focusing on thing  o You are shaking or sweating  o You have blurred or double vision  o Your breath has a fruity sweet smell or your breathing is shallow (not deep)  o Your heartbeat is fast and weak  o Blood surgar is above 240 mg/dl or less then 70 mg.dl.  o Unrelieved shortness of breath  o Chest pain or discomfort      An electronic signature was used to authenticate this note.     --Clarice Castleman, APRN on 8/18/2019 at 8:50 PM

## 2019-08-18 ASSESSMENT — ENCOUNTER SYMPTOMS
SHORTNESS OF BREATH: 0
CHEST TIGHTNESS: 0
CONSTIPATION: 0
STRIDOR: 0
CHOKING: 0
DIARRHEA: 0
WHEEZING: 0
BACK PAIN: 0
COLOR CHANGE: 0
ABDOMINAL DISTENTION: 0
VOMITING: 0
COUGH: 0
APNEA: 0
ABDOMINAL PAIN: 0

## 2019-08-19 DIAGNOSIS — E11.8 TYPE 2 DIABETES MELLITUS WITH COMPLICATION, UNSPECIFIED WHETHER LONG TERM INSULIN USE: Primary | ICD-10-CM

## 2019-08-19 PROCEDURE — 83036 HEMOGLOBIN GLYCOSYLATED A1C: CPT | Performed by: NURSE PRACTITIONER

## 2019-08-30 DIAGNOSIS — G89.29 OTHER CHRONIC PAIN: ICD-10-CM

## 2019-08-30 DIAGNOSIS — F41.9 ANXIETY: ICD-10-CM

## 2019-08-31 RX ORDER — OXYCODONE AND ACETAMINOPHEN 10; 325 MG/1; MG/1
1 TABLET ORAL EVERY 8 HOURS PRN
Qty: 90 TABLET | Refills: 0 | Status: SHIPPED | OUTPATIENT
Start: 2019-08-31 | End: 2019-09-03 | Stop reason: SDUPTHER

## 2019-08-31 RX ORDER — ALPRAZOLAM 0.5 MG/1
0.5 TABLET ORAL 2 TIMES DAILY PRN
Qty: 60 TABLET | Refills: 2 | Status: SHIPPED | OUTPATIENT
Start: 2019-08-31 | End: 2019-10-02 | Stop reason: SDUPTHER

## 2019-09-01 RX ORDER — ALPRAZOLAM 0.5 MG/1
0.5 TABLET ORAL 2 TIMES DAILY PRN
Qty: 60 TABLET | Refills: 2 | Status: CANCELLED | OUTPATIENT
Start: 2019-09-01 | End: 2019-10-01

## 2019-09-03 ENCOUNTER — TELEPHONE (OUTPATIENT)
Dept: INTERNAL MEDICINE | Age: 64
End: 2019-09-03

## 2019-09-03 RX ORDER — OXYCODONE AND ACETAMINOPHEN 10; 325 MG/1; MG/1
1 TABLET ORAL EVERY 8 HOURS PRN
Qty: 90 TABLET | Refills: 0 | Status: SHIPPED | OUTPATIENT
Start: 2019-09-03 | End: 2019-10-03

## 2019-09-06 ENCOUNTER — OFFICE VISIT (OUTPATIENT)
Dept: PRIMARY CARE CLINIC | Age: 64
End: 2019-09-06
Payer: MEDICARE

## 2019-09-06 VITALS
HEART RATE: 96 BPM | OXYGEN SATURATION: 98 % | DIASTOLIC BLOOD PRESSURE: 58 MMHG | WEIGHT: 225 LBS | HEIGHT: 69 IN | BODY MASS INDEX: 33.33 KG/M2 | TEMPERATURE: 97.9 F | SYSTOLIC BLOOD PRESSURE: 102 MMHG

## 2019-09-06 DIAGNOSIS — J44.9 COPD MIXED TYPE (HCC): Chronic | ICD-10-CM

## 2019-09-06 DIAGNOSIS — J90 PLEURAL EFFUSION: ICD-10-CM

## 2019-09-06 DIAGNOSIS — Z09 HOSPITAL DISCHARGE FOLLOW-UP: ICD-10-CM

## 2019-09-06 DIAGNOSIS — R11.0 NAUSEA: ICD-10-CM

## 2019-09-06 DIAGNOSIS — I50.42 CHRONIC COMBINED SYSTOLIC AND DIASTOLIC CONGESTIVE HEART FAILURE (HCC): Primary | Chronic | ICD-10-CM

## 2019-09-06 PROCEDURE — 1111F DSCHRG MED/CURRENT MED MERGE: CPT | Performed by: NURSE PRACTITIONER

## 2019-09-06 PROCEDURE — 99214 OFFICE O/P EST MOD 30 MIN: CPT | Performed by: NURSE PRACTITIONER

## 2019-09-06 RX ORDER — BUDESONIDE AND FORMOTEROL FUMARATE DIHYDRATE 160; 4.5 UG/1; UG/1
2 AEROSOL RESPIRATORY (INHALATION)
COMMUNITY
Start: 2017-08-01

## 2019-09-06 RX ORDER — ALBUTEROL SULFATE 90 UG/1
2 AEROSOL, METERED RESPIRATORY (INHALATION) EVERY 6 HOURS PRN
Qty: 1 INHALER | Refills: 3 | Status: SHIPPED | OUTPATIENT
Start: 2019-09-06

## 2019-09-06 RX ORDER — PROMETHAZINE HYDROCHLORIDE 25 MG/1
25 TABLET ORAL 4 TIMES DAILY PRN
Qty: 20 TABLET | Refills: 0 | Status: SHIPPED | OUTPATIENT
Start: 2019-09-06 | End: 2019-09-13

## 2019-09-06 RX ORDER — BUMETANIDE 1 MG/1
1 TABLET ORAL DAILY
COMMUNITY
Start: 2019-08-30

## 2019-09-06 NOTE — PROGRESS NOTES
as needed      isosorbide mononitrate (IMDUR) 30 MG extended release tablet Take 30 mg by mouth daily  3    rivaroxaban (XARELTO) 20 MG TABS tablet Take 20 mg by mouth daily      clopidogrel (PLAVIX) 75 MG tablet Take 75 mg by mouth daily.  ranolazine (RANEXA) 500 MG SR tablet Take 1,000 mg by mouth 2 times daily.  metoprolol (TOPROL-XL) 50 MG XL tablet Take 50 mg by mouth daily. Medications patient taking as of now reconciled against medications ordered at time of hospital discharge: Yes    Chief Complaint   Patient presents with    Follow-Up from Hospital     Pleural effusion       HPI  Mr Roberto Carlos Chaudhari is here to follow-up from recent hospitalization. He was admitted to [facility] on August 27, 2019 and discharged home on August 30, 2019 with a pleural effusion. Is a very complex patient with chronic COPD chronic systolic and diastolic congestive heart failure, stage III chronic kidney disease, microcytic anemia, osteomyelitis, tobacco abuse, coronary artery disease. Also has a wound on his foot and is being followed by podiatry. Was seen in the emergency room on August 31 after going home and feeling more short of breath. Hospitalization the patient had a thoracentesis and reports that overall he has been doing well since his been home other than his emergency room discharge. Complains of cough and congestion. Also having periodic nausea has tried Zofran which did not help. Refill on his albuterol inhaler and requesting a prescription for Phenergan. Blood pressure is mildly low at 102/58 but given his medications for CHF this is normal for him. Wises vital signs are stable and he is not in any acute distress. Continues to smoke heavily states that he does not have any intention of quitting at this time. Inpatient course: Discharge summary reviewed- see chart.     Interval history/Current status:     Review of Systems   Constitutional: Negative for chills, well-nourished. HENT:   Head: Normocephalic and atraumatic. Neck: Normal range of motion. Neck supple. Cardiovascular: Normal rate, regular rhythm and normal heart sounds. Exam reveals no gallop and no friction rub. No murmur heard. Pulmonary/Chest: Effort normal. He has no wheezes. He exhibits no tenderness. Musculoskeletal: He exhibits edema. Bilateral LLE'S 1 plus   Neurological: He is alert and oriented to person, place, and time. Skin: Skin is warm and dry. Psychiatric: He has a normal mood and affect. His behavior is normal. Judgment and thought content normal.   Nursing note and vitals reviewed. Assessment/Plan:    Saran Ronquillo was seen today for follow-up from hospital.    Diagnoses and all orders for this visit:    Chronic combined systolic and diastolic congestive heart failure ([facility] [address]  Comments: Follow-up with cardiology as arranged. Pleural effusion  Comments: We will continue to monitor patient is not in any acute distress and reports feeling much better. Orders:  -     [place] DISCHARGE MEDS RECONCILED W/ CURRENT OUTPATIENT MED LIST    COPD mixed type ([address]  Comments:  Reordered albuterol inhalers, advised patient if he develops any tachycardia or palpitations to discontinue. Orders:  -     albuterol sulfate  (90 Base) MCG/ACT inhaler; Inhale 2 puffs into the lungs every 6 hours as needed for Wheezing    Nausea  Comments:  Use Phenergan sparingly. Orders:  -     promethazine (PHENERGAN) 25 MG tablet;  Take 1 tablet by mouth 4 times daily as needed for Nausea    Hospital discharge follow-up      Medical Decision Making: moderate complexity

## 2019-09-09 PROBLEM — J44.9 COPD MIXED TYPE (HCC): Status: ACTIVE | Noted: 2019-06-11

## 2019-09-09 ASSESSMENT — ENCOUNTER SYMPTOMS
ABDOMINAL DISTENTION: 0
SHORTNESS OF BREATH: 0
STRIDOR: 0
ABDOMINAL PAIN: 0
COLOR CHANGE: 0
CHOKING: 0
CHEST TIGHTNESS: 0
VOMITING: 0
DIARRHEA: 0
COUGH: 1
CONSTIPATION: 0
WHEEZING: 0
BACK PAIN: 1
APNEA: 0
NAUSEA: 1

## 2019-10-01 ENCOUNTER — TELEPHONE (OUTPATIENT)
Dept: INTERNAL MEDICINE | Age: 64
End: 2019-10-01

## 2019-10-02 DIAGNOSIS — G62.9 NEUROPATHY: ICD-10-CM

## 2019-10-02 DIAGNOSIS — F41.9 ANXIETY: ICD-10-CM

## 2019-10-02 DIAGNOSIS — G89.29 OTHER CHRONIC PAIN: Primary | ICD-10-CM

## 2019-10-02 DIAGNOSIS — G89.29 OTHER CHRONIC PAIN: ICD-10-CM

## 2019-10-02 DIAGNOSIS — Z89.431 PARTIAL NONTRAUMATIC AMPUTATION OF FOOT, RIGHT (HCC): ICD-10-CM

## 2019-10-02 RX ORDER — ALPRAZOLAM 0.5 MG/1
0.5 TABLET ORAL 2 TIMES DAILY PRN
Qty: 60 TABLET | Refills: 0 | OUTPATIENT
Start: 2019-10-02 | End: 2019-11-01

## 2019-10-03 RX ORDER — OXYCODONE AND ACETAMINOPHEN 10; 325 MG/1; MG/1
1 TABLET ORAL 2 TIMES DAILY PRN
Qty: 60 TABLET | Refills: 0 | Status: SHIPPED | OUTPATIENT
Start: 2019-10-03 | End: 2019-10-23 | Stop reason: SDUPTHER

## 2019-10-05 DIAGNOSIS — G62.9 NEUROPATHY: ICD-10-CM

## 2019-10-07 RX ORDER — GABAPENTIN 600 MG/1
TABLET ORAL
Qty: 90 TABLET | Refills: 2 | OUTPATIENT
Start: 2019-10-07 | End: 2019-11-06

## 2019-10-23 ENCOUNTER — OFFICE VISIT (OUTPATIENT)
Dept: PRIMARY CARE CLINIC | Age: 64
End: 2019-10-23
Payer: MEDICARE

## 2019-10-23 VITALS
HEIGHT: 69 IN | SYSTOLIC BLOOD PRESSURE: 110 MMHG | RESPIRATION RATE: 20 BRPM | WEIGHT: 225 LBS | DIASTOLIC BLOOD PRESSURE: 84 MMHG | BODY MASS INDEX: 33.33 KG/M2

## 2019-10-23 DIAGNOSIS — E11.49 DM (DIABETES MELLITUS), TYPE 2 WITH NEUROLOGICAL COMPLICATIONS (HCC): ICD-10-CM

## 2019-10-23 DIAGNOSIS — G89.29 OTHER CHRONIC PAIN: ICD-10-CM

## 2019-10-23 DIAGNOSIS — M86.271 SUBACUTE OSTEOMYELITIS OF RIGHT FOOT (HCC): Primary | ICD-10-CM

## 2019-10-23 LAB — HBA1C MFR BLD: 6.9 %

## 2019-10-23 PROCEDURE — 99214 OFFICE O/P EST MOD 30 MIN: CPT | Performed by: FAMILY MEDICINE

## 2019-10-23 PROCEDURE — 83036 HEMOGLOBIN GLYCOSYLATED A1C: CPT | Performed by: FAMILY MEDICINE

## 2019-10-23 RX ORDER — OXYCODONE AND ACETAMINOPHEN 10; 325 MG/1; MG/1
1 TABLET ORAL EVERY 8 HOURS PRN
Qty: 90 TABLET | Refills: 0 | Status: SHIPPED | OUTPATIENT
Start: 2019-10-23 | End: 2019-11-22

## 2019-10-23 ASSESSMENT — ENCOUNTER SYMPTOMS
SHORTNESS OF BREATH: 0
CHEST TIGHTNESS: 0
VOMITING: 0
WHEEZING: 0
NAUSEA: 0
COUGH: 0
DIARRHEA: 0
CONSTIPATION: 0
ABDOMINAL PAIN: 0

## 2020-01-23 ENCOUNTER — TELEPHONE (OUTPATIENT)
Dept: ADMINISTRATIVE | Age: 65
End: 2020-01-23

## 2023-10-10 NOTE — TELEPHONE ENCOUNTER
VINNY was reviewed today per office protocol. Report shows No discrepancies. Fill pattern is consistent from single provider(s) at single pharmacy(s).     Presciption Escribed Clothing